# Patient Record
Sex: FEMALE | Employment: FULL TIME | URBAN - NONMETROPOLITAN AREA
[De-identification: names, ages, dates, MRNs, and addresses within clinical notes are randomized per-mention and may not be internally consistent; named-entity substitution may affect disease eponyms.]

---

## 2022-11-22 ENCOUNTER — OFFICE VISIT (OUTPATIENT)
Dept: URGENT CARE | Facility: PHYSICIAN GROUP | Age: 47
End: 2022-11-22
Payer: COMMERCIAL

## 2022-11-22 VITALS
HEIGHT: 62 IN | OXYGEN SATURATION: 100 % | HEART RATE: 70 BPM | SYSTOLIC BLOOD PRESSURE: 122 MMHG | BODY MASS INDEX: 28.34 KG/M2 | WEIGHT: 154 LBS | TEMPERATURE: 97.6 F | RESPIRATION RATE: 16 BRPM | DIASTOLIC BLOOD PRESSURE: 80 MMHG

## 2022-11-22 DIAGNOSIS — H60.501 ACUTE OTITIS EXTERNA OF RIGHT EAR, UNSPECIFIED TYPE: ICD-10-CM

## 2022-11-22 PROCEDURE — 99203 OFFICE O/P NEW LOW 30 MIN: CPT

## 2022-11-22 RX ORDER — NEOMYCIN SULFATE, POLYMYXIN B SULFATE AND HYDROCORTISONE 10; 3.5; 1 MG/ML; MG/ML; [USP'U]/ML
4 SUSPENSION/ DROPS AURICULAR (OTIC) 4 TIMES DAILY
Qty: 12 ML | Refills: 0 | Status: SHIPPED | OUTPATIENT
Start: 2022-11-22 | End: 2022-11-29

## 2022-11-22 ASSESSMENT — ENCOUNTER SYMPTOMS
HEMOPTYSIS: 0
CHILLS: 0
COUGH: 0
SORE THROAT: 0
WEIGHT LOSS: 0
DIAPHORESIS: 0
SHORTNESS OF BREATH: 0
WHEEZING: 0
STRIDOR: 0
SPUTUM PRODUCTION: 0
FEVER: 0
MYALGIAS: 0
SINUS PAIN: 0

## 2022-11-22 NOTE — PROGRESS NOTES
"Subjective:   Alma Eugene is a 47 y.o. female who presents for Otalgia (Right ear pain x 3 days. Swollen and tender gland on that side. )      HPI:  This is a 47-year-old female presents today for right ear pain.  This new problem.  Patient reports developing  pain to right ear 2 days ago.  Patient reports having water sensation and drainage from right ear.  She reports right ear pain is 6\10.  She has taken ibuprofen for this which has been somewhat helpful.  She does report associated swollen lymph node behind right ear.  She denies fevers, chills, body aches, recent illnesses.    Review of Systems   Constitutional:  Negative for chills, diaphoresis, fever, malaise/fatigue and weight loss.   HENT:  Positive for ear discharge and ear pain. Negative for congestion, sinus pain and sore throat.    Respiratory:  Negative for cough, hemoptysis, sputum production, shortness of breath, wheezing and stridor.    Musculoskeletal:  Negative for myalgias.   All other systems reviewed and are negative.    Medications:    No current outpatient medications on file prior to visit.     No current facility-administered medications on file prior to visit.        Allergies:   Patient has no known allergies.    Problem List:   There is no problem list on file for this patient.       Surgical History:  No past surgical history on file.    Past Social Hx:   Social History     Tobacco Use    Smoking status: Never    Smokeless tobacco: Never          Problem list, medications, and allergies reviewed by myself today in Epic.     Objective:     /80   Pulse 70   Temp 36.4 °C (97.6 °F) (Temporal)   Resp 16   Ht 1.575 m (5' 2\")   Wt 69.9 kg (154 lb)   SpO2 100%   BMI 28.17 kg/m²     Physical Exam  Vitals and nursing note reviewed.   Constitutional:       General: She is awake. She is not in acute distress.     Appearance: Normal appearance. She is well-developed and normal weight. She is not ill-appearing, toxic-appearing or " diaphoretic.   HENT:      Head: Normocephalic and atraumatic.      Right Ear: Tenderness present.      Left Ear: Tympanic membrane, ear canal and external ear normal. There is no impacted cerumen.      Ears:      Comments: Right ear canal positive for erythema and edema      Nose: Nose normal. No congestion or rhinorrhea.      Mouth/Throat:      Mouth: Mucous membranes are moist.      Pharynx: Oropharynx is clear. No oropharyngeal exudate or posterior oropharyngeal erythema.   Cardiovascular:      Rate and Rhythm: Normal rate and regular rhythm.      Pulses: Normal pulses.      Heart sounds: Normal heart sounds. No murmur heard.    No friction rub. No gallop.   Pulmonary:      Effort: Pulmonary effort is normal. No respiratory distress.      Breath sounds: Normal breath sounds. No stridor. No wheezing, rhonchi or rales.   Chest:      Chest wall: No tenderness.   Musculoskeletal:      Cervical back: Neck supple. No tenderness.   Lymphadenopathy:      Head:      Right side of head: Posterior auricular adenopathy present.      Cervical: No cervical adenopathy.   Skin:     General: Skin is warm and dry.      Capillary Refill: Capillary refill takes less than 2 seconds.   Neurological:      General: No focal deficit present.      Mental Status: She is alert and oriented to person, place, and time. Mental status is at baseline.      Cranial Nerves: No cranial nerve deficit.      Motor: No weakness.      Gait: Gait normal.   Psychiatric:         Mood and Affect: Mood normal.         Behavior: Behavior normal. Behavior is cooperative.         Thought Content: Thought content normal.         Judgment: Judgment normal.       Assessment/Plan:     Diagnosis and associated orders:   1. Acute otitis externa of right ear, unspecified type  neomycin-polymyxin-HC (PEDIOTIC HC) 3.5-32099-0 Suspension          Comments/MDM:   Pt is clinically stable at today's acute urgent care visit.  No acute distress noted. Appropriate for  outpatient management at this time.     Acute problem. Physical exam findings consistent with acute otitis externa of left ear.  Patient will be treated with antibiotic otic drops for this.  Advised patient to begin administering otic drops as prescribed, use Tylenol and ibuprofen as needed for pain, and use warm compresses to right ear.  I have advised her to keep ears clean and dry.  She is to return to  for any new or worsening signs or symptoms, and follow-up with PCP for recheck.  Patient agreeable plan of care verbalizes good understanding today.           Discussed DDx, management options (risks,benefits, and alternatives to planned treatment), natural progression and supportive care.  Expressed understanding and the treatment plan was agreed upon. Questions were encouraged and answered   Return to urgent care prn if new or worsening sx or if there is no improvement in condition prn.    Educated in Red flags and indications to immediately call 911 or present to the Emergency Department.   Advised the patient to follow-up with the primary care physician for recheck, reevaluation, and consideration of further management.    I personally reviewed prior external notes and test results pertinent to today's visit.  I have independently reviewed and interpreted all diagnostics ordered during this urgent care acute visit.       Please note that this dictation was created using voice recognition software. I have made a reasonable attempt to correct obvious errors, but I expect that there are errors of grammar and possibly content that I did not discover before finalizing the note.    This note was electronically signed by CAROLYN Sprague

## 2022-11-22 NOTE — LETTER
November 22, 2022    To Whom It May Concern:         This is confirmation that Alma AUNG Eugene attended her scheduled appointment with YULISSA Pulido on 11/22/22. Please excuse her from work 11/22/22.          If you have any questions please do not hesitate to call me at the phone number listed below.      Sincerely,          YULISSA Pulido   Electronically signed  [phone]

## 2025-03-11 NOTE — Clinical Note
Member Name: Alma Eugene   Member Number: [id number]   Reference Number: [id number]   Approved Services: Hospital - Outpatient   Approved Service Dates: 03/11/2025 - 05/09/2025   Requesting Provider: Thomas Fajardo   Requested Provider: [facility]     Dear Almarick Eugene:     The following medical service(s) requested by Thomas Fajardo have been approved:    Procedure Code Procedure Code Name Requested Quantity Approved Quantity Status   56973 (CPT®) MO COLONOSCOPY-FLEXIBLE 1 1 Authorized    MO COLON CA SCRN NOT HI RSK IND 1 1 Authorized       Approved Quantity means the number of visits approved for medication treatments and/or medical services.    The services should be provided by [facility] no later than 05/09/2025. Please contact the provider listed below with any questions.     Provider Information:  [facility]  [phone]    Your plan benefit may require a deductible, co-payment or coinsurance for these services. This authorization does not guarantee [facility] will pay the claim for services that you receive. Payment by [facility] for these services is subject to the terms of your Evidence of Coverage or Summary Plan Description, your eligibility at the time of service, and confirmation of benefit coverage.    For any questions or additional information, please contact Customer Service:    [facility]  Customer Service: [phone] or toll free [phone]  TTY users dial: [phone]   Call Center Hours: Mon - Fri 7 AM to 8 PM PST   Office Hours: Mon - Fri 8 AM to 5 PM [facility]   E-mail: [email or web address]   Website: [email or web address]       This information is available for free in other languages. Please contact Customer Service at the phone number above for more information. [facility] complies with applicable Federal civil rights laws and does not discriminate on the basis of race, color,  national origin, age, disability or sex.      Sincerely,     Healthcare Utilization Management Department     Cc: [facility]   Thomas Fajardo